# Patient Record
Sex: MALE
[De-identification: names, ages, dates, MRNs, and addresses within clinical notes are randomized per-mention and may not be internally consistent; named-entity substitution may affect disease eponyms.]

---

## 2020-09-29 ENCOUNTER — HOSPITAL ENCOUNTER (OUTPATIENT)
Dept: HOSPITAL 92 - TBSIIMAG | Age: 60
Discharge: HOME | End: 2020-09-29
Attending: UROLOGY
Payer: COMMERCIAL

## 2020-09-29 DIAGNOSIS — C61: Primary | ICD-10-CM

## 2020-09-29 LAB — ESTIMATED GFR-MDRD - POC: 76

## 2020-09-29 PROCEDURE — 81001 URINALYSIS AUTO W/SCOPE: CPT

## 2020-09-29 PROCEDURE — 72197 MRI PELVIS W/O & W/DYE: CPT

## 2020-09-29 PROCEDURE — A9579 GAD-BASE MR CONTRAST NOS,1ML: HCPCS

## 2020-09-29 PROCEDURE — 80053 COMPREHEN METABOLIC PANEL: CPT

## 2020-09-29 PROCEDURE — 87086 URINE CULTURE/COLONY COUNT: CPT

## 2020-09-29 PROCEDURE — 82565 ASSAY OF CREATININE: CPT

## 2020-09-29 NOTE — MRI
EXAM: MRI of the pelvis/prostate without and with contrast



HISTORY: Prostate cancer



COMPARISON: None



TECHNIQUE: Multiplanar multisequence MR images were obtained of the pelvis without and with IV contra
st. Evaluation of this exam was performed with a [employer] workstation.



FINDINGS:

Central gland: Moderate hypertrophy of the central gland consistent with BPH. Prostate volume is korin
mated at 39 mL. No suspicious low T2 signal lesion is seen.

Peripheral zone: There is slight limitation on the diffusion sequence and ADC map in the posterior ce
ntral peripheral zone of the prostate secondary to air in the patient's rectum producing artifact.

There is a 6 mm focus of low T2 signal in the right peripheral zone of the prostate. Evaluation for r
estricted diffusion in this region cannot be performed. This area focally enhances on the early

images in the peripheral zone.



Seminal vesicles: Intact without abnormality

Neurovascular bundles: Intact

Pelvic lymph nodes: No pelvic adenopathy

Other visualized intrapelvic structures: Scattered diverticula in the colon.

Osseous structures: No marrow signal abnormality



IMPRESSION:

PI-RADS Category 2-low likelihood that a clinically significant cancer is present. Please note that t
his exam is limited secondary to artifact from air in the patient's rectum. A follow-up of the

right peripheral zone lesion should be performed in 6-12 months. Evacuation of the rectal air with a 
fleets enema one hour prior to the examination is recommended.



Reported By: Guy North 

Electronically Signed:  9/29/2020 4:26 PM

## 2020-10-01 ENCOUNTER — HOSPITAL ENCOUNTER (OUTPATIENT)
Dept: HOSPITAL 92 - SCSCT | Age: 60
Discharge: HOME | End: 2020-10-01
Attending: UROLOGY
Payer: COMMERCIAL

## 2020-10-01 DIAGNOSIS — C61: Primary | ICD-10-CM

## 2020-10-01 DIAGNOSIS — N40.0: ICD-10-CM

## 2020-10-01 DIAGNOSIS — N20.0: ICD-10-CM

## 2020-10-01 DIAGNOSIS — N42.89: ICD-10-CM

## 2020-10-01 PROCEDURE — 74178 CT ABD&PLV WO CNTR FLWD CNTR: CPT

## 2020-10-01 NOTE — CT
CT ABDOMEN AND PELVIS WITH AND WITHOUT IV CONTRAST: 

10/1/20

 

Postcontrast images obtained in a portal venous phase and delayed venous phase.

 

INDICATIONS:

Prostate hypertrophy. 

 

Review of the noncontrast images reveals a 3 mm nonobstructing calculus in the upper collecting struc
tures of the right kidney. No other renal calculus identified. There is no hydronephrosis. Ureters ar
e normal caliber. Review of the postcontrast images shows symmetric enhancement of both kidneys. No e
vidence of renal mass or cysts. Delayed sequence shows excretion into the collecting structures. The 
collecting structure appear unremarkable. The bladder is mildly distended. 

 

Mild prostatic hypertrophy with prostatic calcification. 

 

The liver, spleen and pancreas unremarkable. Adrenal glands normal. 

 

Small bowel loops appear normal. 

 

Appendix appears normal. There is a small nodular calcification in the right lower quadrant adjacent 
to the appendix which appears benign and presumably represents a calcified mesenteric lymph node. The
re is diverticulosis of the sigmoid colon. 

 

The aorta is normal with mild atherosclerotic change. 

 

No evidence of adenopathy. 

 

 

The visualized vertebrae maintain height. Slight anterolisthesis at L5-S1 with mild degenerative martin
ges in the lumbar spine. More prominent bridging  osteophytes in the lower thoracic spine. Pelvis unr
emarkable. 

 

Images through the lung bases show calcified mediastinal and right hilar lymph nodes. 

 

IMPRESSION: 

1.      Small nonobstructing calculus in the upper collecting structures right kidney. 

2.      Mild prostatic hypertrophy with prostatic calcification. 

3.      No acute process. 

 

POS: AGW

## 2021-01-20 ENCOUNTER — HOSPITAL ENCOUNTER (OUTPATIENT)
Dept: HOSPITAL 92 - TBSIIMAG | Age: 61
Discharge: HOME | End: 2021-01-20
Attending: UROLOGY
Payer: COMMERCIAL

## 2021-01-20 DIAGNOSIS — C61: Primary | ICD-10-CM

## 2021-01-20 PROCEDURE — 72197 MRI PELVIS W/O & W/DYE: CPT

## 2021-01-20 NOTE — MRI
MR OF THE PELVIS WITH AND WITHOUT CONTRAST



INDICATION: Prostate Cancer



COMPARISON: Prior MR of the pelvis with and without contrast dated September 29, 2020



TECHNIQUE: Multiplanar, multisequence MR images were obtained of the pelvis with and without IV contr
ast. 16 cc of MultiHance was utilized for the examination. The examination was reviewed on a

separate [employer] workstation for multiplanar metric evaluation.



FINDINGS:



Prostate size:  The prostate measured 4.7 x 3.9 x 4.5cm.   37.71 cc. 



Peripheral zone: The small focal area of T2 hyperintensity measuring approximately 6-7 mm within the 
lateral right mid gland is stable. No restricted diffusion is associated with this abnormality. No

new suspicious T2 or diffusion-weighted signal abnormality is evident.



Central zone: No suspicious signal abnormality or focal lesion.



Neural vasculature: No evidence of neurovascular invasion



Regional lymphadenopathy: None



Dynamic contrast enhancement: Negative. 



Osseous structures: No suspicious osseous lesion is identified. 



Additional findings: None..



IMPRESSION:

1. PIRADS 2- Low (clinically significant cancer is unlikely to be present.)



Reported By: Alo Nixon 

Electronically Signed:  1/20/2021 3:22 PM

## 2021-03-24 ENCOUNTER — HOSPITAL ENCOUNTER (INPATIENT)
Dept: HOSPITAL 92 - SURG A | Age: 61
LOS: 8 days | Discharge: HOME | DRG: 707 | End: 2021-04-01
Attending: UROLOGY | Admitting: UROLOGY
Payer: COMMERCIAL

## 2021-03-24 ENCOUNTER — HOSPITAL ENCOUNTER (OUTPATIENT)
Dept: HOSPITAL 92 - LABBT | Age: 61
Discharge: HOME | End: 2021-03-24
Attending: UROLOGY
Payer: COMMERCIAL

## 2021-03-24 VITALS — BODY MASS INDEX: 27.8 KG/M2

## 2021-03-24 DIAGNOSIS — Z01.818: Primary | ICD-10-CM

## 2021-03-24 DIAGNOSIS — C61: ICD-10-CM

## 2021-03-24 DIAGNOSIS — N20.0: ICD-10-CM

## 2021-03-24 DIAGNOSIS — C61: Primary | ICD-10-CM

## 2021-03-24 DIAGNOSIS — Z20.822: ICD-10-CM

## 2021-03-24 DIAGNOSIS — R00.0: ICD-10-CM

## 2021-03-24 DIAGNOSIS — M25.511: ICD-10-CM

## 2021-03-24 DIAGNOSIS — I10: ICD-10-CM

## 2021-03-24 DIAGNOSIS — R18.8: ICD-10-CM

## 2021-03-24 DIAGNOSIS — R97.20: ICD-10-CM

## 2021-03-24 DIAGNOSIS — K40.90: ICD-10-CM

## 2021-03-24 DIAGNOSIS — K42.9: ICD-10-CM

## 2021-03-24 DIAGNOSIS — M25.50: ICD-10-CM

## 2021-03-24 DIAGNOSIS — N52.9: ICD-10-CM

## 2021-03-24 DIAGNOSIS — R81: ICD-10-CM

## 2021-03-24 DIAGNOSIS — M25.512: ICD-10-CM

## 2021-03-24 DIAGNOSIS — E11.8: ICD-10-CM

## 2021-03-24 LAB
ALBUMIN SERPL BCG-MCNC: 4.6 G/DL (ref 3.4–4.8)
ALP SERPL-CCNC: 90 U/L (ref 40–110)
ALT SERPL W P-5'-P-CCNC: 38 U/L (ref 8–55)
ANION GAP SERPL CALC-SCNC: 15 MMOL/L (ref 10–20)
APTT PPP: 29.9 SEC (ref 22–33)
AST SERPL-CCNC: 32 U/L (ref 5–34)
BILIRUB SERPL-MCNC: 0.6 MG/DL (ref 0.2–1.2)
BUN SERPL-MCNC: 12 MG/DL (ref 8.4–25.7)
CALCIUM SERPL-MCNC: 9.5 MG/DL (ref 7.8–10.44)
CHLORIDE SERPL-SCNC: 106 MMOL/L (ref 98–107)
CO2 SERPL-SCNC: 25 MMOL/L (ref 23–31)
CREAT CL PREDICTED SERPL C-G-VRATE: 0 ML/MIN (ref 70–130)
GLOBULIN SER CALC-MCNC: 3.1 G/DL (ref 2.4–3.5)
GLUCOSE SERPL-MCNC: 81 MG/DL (ref 80–115)
HGB BLD-MCNC: 15.3 G/DL (ref 13.5–17.5)
INR PPP: 1.1
LEUKOCYTE ESTERASE UR QL STRIP.AUTO: 500
MCH RBC QN AUTO: 29.4 PG (ref 27–33)
MCV RBC AUTO: 90.4 FL (ref 81.2–95.1)
PLATELET # BLD AUTO: 243 10X3/UL (ref 150–450)
POTASSIUM SERPL-SCNC: 4.6 MMOL/L (ref 3.5–5.1)
PROTHROMBIN TIME: 11.1 SEC (ref 9.5–12.1)
RBC # BLD AUTO: 5.21 10X6/UL (ref 4.32–5.72)
RBC UR QL AUTO: (no result) HPF (ref 0–3)
SODIUM SERPL-SCNC: 141 MMOL/L (ref 136–145)
SP GR UR STRIP: 1.01 (ref 1–1.04)
WBC # BLD AUTO: 7.7 10X3/UL (ref 3.5–10.5)
WBC UR QL AUTO: (no result) HPF (ref 0–3)

## 2021-03-24 PROCEDURE — 86850 RBC ANTIBODY SCREEN: CPT

## 2021-03-24 PROCEDURE — 82570 ASSAY OF URINE CREATININE: CPT

## 2021-03-24 PROCEDURE — 85027 COMPLETE CBC AUTOMATED: CPT

## 2021-03-24 PROCEDURE — 93010 ELECTROCARDIOGRAM REPORT: CPT

## 2021-03-24 PROCEDURE — 86901 BLOOD TYPING SEROLOGIC RH(D): CPT

## 2021-03-24 PROCEDURE — 85730 THROMBOPLASTIN TIME PARTIAL: CPT

## 2021-03-24 PROCEDURE — 88309 TISSUE EXAM BY PATHOLOGIST: CPT

## 2021-03-24 PROCEDURE — 85025 COMPLETE CBC W/AUTO DIFF WBC: CPT

## 2021-03-24 PROCEDURE — 36416 COLLJ CAPILLARY BLOOD SPEC: CPT

## 2021-03-24 PROCEDURE — 87086 URINE CULTURE/COLONY COUNT: CPT

## 2021-03-24 PROCEDURE — 71046 X-RAY EXAM CHEST 2 VIEWS: CPT

## 2021-03-24 PROCEDURE — 86900 BLOOD TYPING SEROLOGIC ABO: CPT

## 2021-03-24 PROCEDURE — 87635 SARS-COV-2 COVID-19 AMP PRB: CPT

## 2021-03-24 PROCEDURE — 93005 ELECTROCARDIOGRAM TRACING: CPT

## 2021-03-24 PROCEDURE — 84484 ASSAY OF TROPONIN QUANT: CPT

## 2021-03-24 PROCEDURE — 80048 BASIC METABOLIC PNL TOTAL CA: CPT

## 2021-03-24 PROCEDURE — U0005 INFEC AGEN DETEC AMPLI PROBE: HCPCS

## 2021-03-24 PROCEDURE — U0003 INFECTIOUS AGENT DETECTION BY NUCLEIC ACID (DNA OR RNA); SEVERE ACUTE RESPIRATORY SYNDROME CORONAVIRUS 2 (SARS-COV-2) (CORONAVIRUS DISEASE [COVID-19]), AMPLIFIED PROBE TECHNIQUE, MAKING USE OF HIGH THROUGHPUT TECHNOLOGIES AS DESCRIBED BY CMS-2020-01-R: HCPCS

## 2021-03-24 PROCEDURE — 36415 COLL VENOUS BLD VENIPUNCTURE: CPT

## 2021-03-24 PROCEDURE — 81001 URINALYSIS AUTO W/SCOPE: CPT

## 2021-03-24 PROCEDURE — 80053 COMPREHEN METABOLIC PANEL: CPT

## 2021-03-24 PROCEDURE — 85610 PROTHROMBIN TIME: CPT

## 2021-03-24 PROCEDURE — S0028 INJECTION, FAMOTIDINE, 20 MG: HCPCS

## 2021-03-29 LAB
ANION GAP SERPL CALC-SCNC: 16 MMOL/L (ref 10–20)
ANION GAP SERPL CALC-SCNC: 16 MMOL/L (ref 10–20)
BASOPHILS # BLD AUTO: 0 THOU/UL (ref 0–0.2)
BASOPHILS NFR BLD AUTO: 0.3 % (ref 0–1)
BUN SERPL-MCNC: 10 MG/DL (ref 8.4–25.7)
BUN SERPL-MCNC: 11 MG/DL (ref 8.4–25.7)
CALCIUM SERPL-MCNC: 8.1 MG/DL (ref 7.8–10.44)
CALCIUM SERPL-MCNC: 8.2 MG/DL (ref 7.8–10.44)
CHLORIDE SERPL-SCNC: 106 MMOL/L (ref 98–107)
CHLORIDE SERPL-SCNC: 107 MMOL/L (ref 98–107)
CO2 SERPL-SCNC: 19 MMOL/L (ref 23–31)
CO2 SERPL-SCNC: 19 MMOL/L (ref 23–31)
CREAT CL PREDICTED SERPL C-G-VRATE: 55 ML/MIN (ref 70–130)
CREAT CL PREDICTED SERPL C-G-VRATE: 62 ML/MIN (ref 70–130)
EOSINOPHIL # BLD AUTO: 0 THOU/UL (ref 0–0.7)
EOSINOPHIL NFR BLD AUTO: 0.3 % (ref 0–10)
GLUCOSE SERPL-MCNC: 148 MG/DL (ref 80–115)
GLUCOSE SERPL-MCNC: 149 MG/DL (ref 80–115)
HGB BLD-MCNC: 14.6 G/DL (ref 14–18)
LYMPHOCYTES # BLD: 1 THOU/UL (ref 1.2–3.4)
LYMPHOCYTES NFR BLD AUTO: 5.8 % (ref 21–51)
MCH RBC QN AUTO: 30.8 PG (ref 27–31)
MCV RBC AUTO: 91.9 FL (ref 78–98)
MONOCYTES # BLD AUTO: 1.1 THOU/UL (ref 0.11–0.59)
MONOCYTES NFR BLD AUTO: 6.8 % (ref 0–10)
NEUTROPHILS # BLD AUTO: 14.5 THOU/UL (ref 1.4–6.5)
NEUTROPHILS NFR BLD AUTO: 86.8 % (ref 42–75)
PLATELET # BLD AUTO: 222 THOU/UL (ref 130–400)
POTASSIUM SERPL-SCNC: 5.8 MMOL/L (ref 3.5–5.1)
POTASSIUM SERPL-SCNC: 7.1 MMOL/L (ref 3.5–5.1)
RBC # BLD AUTO: 4.75 MILL/UL (ref 4.7–6.1)
SODIUM SERPL-SCNC: 135 MMOL/L (ref 136–145)
SODIUM SERPL-SCNC: 135 MMOL/L (ref 136–145)
WBC # BLD AUTO: 16.7 THOU/UL (ref 4.8–10.8)

## 2021-03-29 PROCEDURE — 0VT04ZZ RESECTION OF PROSTATE, PERCUTANEOUS ENDOSCOPIC APPROACH: ICD-10-PCS | Performed by: UROLOGY

## 2021-03-29 PROCEDURE — 0VBQ4ZZ EXCISION OF BILATERAL VAS DEFERENS, PERCUTANEOUS ENDOSCOPIC APPROACH: ICD-10-PCS | Performed by: UROLOGY

## 2021-03-29 PROCEDURE — 8E0W4CZ ROBOTIC ASSISTED PROCEDURE OF TRUNK REGION, PERCUTANEOUS ENDOSCOPIC APPROACH: ICD-10-PCS | Performed by: UROLOGY

## 2021-03-29 PROCEDURE — 0VB34ZZ EXCISION OF BILATERAL SEMINAL VESICLES, PERCUTANEOUS ENDOSCOPIC APPROACH: ICD-10-PCS | Performed by: UROLOGY

## 2021-03-29 RX ADMIN — CEFTRIAXONE SCH: 1 INJECTION, POWDER, FOR SOLUTION INTRAMUSCULAR; INTRAVENOUS at 18:59

## 2021-03-29 RX ADMIN — FAMOTIDINE SCH MG: 10 INJECTION, SOLUTION INTRAVENOUS at 20:42

## 2021-03-30 LAB
ANION GAP SERPL CALC-SCNC: 12 MMOL/L (ref 10–20)
BASOPHILS # BLD AUTO: 0 THOU/UL (ref 0–0.2)
BASOPHILS NFR BLD AUTO: 0.3 % (ref 0–1)
BUN SERPL-MCNC: 10 MG/DL (ref 8.4–25.7)
CALCIUM SERPL-MCNC: 8.2 MG/DL (ref 7.8–10.44)
CHLORIDE SERPL-SCNC: 109 MMOL/L (ref 98–107)
CO2 SERPL-SCNC: 22 MMOL/L (ref 23–31)
CREAT CL PREDICTED SERPL C-G-VRATE: 74 ML/MIN (ref 70–130)
EOSINOPHIL # BLD AUTO: 0 THOU/UL (ref 0–0.7)
EOSINOPHIL NFR BLD AUTO: 0.1 % (ref 0–10)
GLUCOSE SERPL-MCNC: 117 MG/DL (ref 80–115)
HGB BLD-MCNC: 14.1 G/DL (ref 14–18)
LYMPHOCYTES # BLD: 1.4 THOU/UL (ref 1.2–3.4)
LYMPHOCYTES NFR BLD AUTO: 13.5 % (ref 21–51)
MCH RBC QN AUTO: 30.9 PG (ref 27–31)
MCV RBC AUTO: 92.5 FL (ref 78–98)
MONOCYTES # BLD AUTO: 1 THOU/UL (ref 0.11–0.59)
MONOCYTES NFR BLD AUTO: 9.3 % (ref 0–10)
NEUTROPHILS # BLD AUTO: 7.8 THOU/UL (ref 1.4–6.5)
NEUTROPHILS NFR BLD AUTO: 76.8 % (ref 42–75)
PLATELET # BLD AUTO: 205 THOU/UL (ref 130–400)
POTASSIUM SERPL-SCNC: 3.6 MMOL/L (ref 3.5–5.1)
RBC # BLD AUTO: 4.56 MILL/UL (ref 4.7–6.1)
SODIUM SERPL-SCNC: 139 MMOL/L (ref 136–145)
WBC # BLD AUTO: 10.2 THOU/UL (ref 4.8–10.8)

## 2021-03-30 RX ADMIN — CEFTRIAXONE SCH MLS: 1 INJECTION, POWDER, FOR SOLUTION INTRAMUSCULAR; INTRAVENOUS at 14:22

## 2021-03-30 RX ADMIN — FAMOTIDINE SCH MG: 10 INJECTION, SOLUTION INTRAVENOUS at 19:15

## 2021-03-30 RX ADMIN — FAMOTIDINE SCH MG: 10 INJECTION, SOLUTION INTRAVENOUS at 08:39

## 2021-03-30 RX ADMIN — HYDROCODONE BITARTRATE AND ACETAMINOPHEN PRN TAB: 10; 325 TABLET ORAL at 02:36

## 2021-03-31 LAB
ALBUMIN SERPL BCG-MCNC: 3.7 G/DL (ref 3.4–4.8)
ALP SERPL-CCNC: 67 U/L (ref 40–110)
ALT SERPL W P-5'-P-CCNC: 31 U/L (ref 8–55)
ANION GAP SERPL CALC-SCNC: 13 MMOL/L (ref 10–20)
ANION GAP SERPL CALC-SCNC: 14 MMOL/L (ref 10–20)
AST SERPL-CCNC: 34 U/L (ref 5–34)
BASOPHILS # BLD AUTO: 0 THOU/UL (ref 0–0.2)
BASOPHILS # BLD AUTO: 0 THOU/UL (ref 0–0.2)
BASOPHILS NFR BLD AUTO: 0.3 % (ref 0–1)
BASOPHILS NFR BLD AUTO: 0.4 % (ref 0–1)
BILIRUB SERPL-MCNC: 0.6 MG/DL (ref 0.2–1.2)
BUN SERPL-MCNC: 15 MG/DL (ref 8.4–25.7)
BUN SERPL-MCNC: 15 MG/DL (ref 8.4–25.7)
CALCIUM SERPL-MCNC: 8.5 MG/DL (ref 7.8–10.44)
CALCIUM SERPL-MCNC: 8.9 MG/DL (ref 7.8–10.44)
CHLORIDE SERPL-SCNC: 107 MMOL/L (ref 98–107)
CHLORIDE SERPL-SCNC: 108 MMOL/L (ref 98–107)
CO2 SERPL-SCNC: 19 MMOL/L (ref 23–31)
CO2 SERPL-SCNC: 20 MMOL/L (ref 23–31)
CREAT CL PREDICTED SERPL C-G-VRATE: 77 ML/MIN (ref 70–130)
CREAT CL PREDICTED SERPL C-G-VRATE: 84 ML/MIN (ref 70–130)
EOSINOPHIL # BLD AUTO: 0.1 THOU/UL (ref 0–0.7)
EOSINOPHIL # BLD AUTO: 0.1 THOU/UL (ref 0–0.7)
EOSINOPHIL NFR BLD AUTO: 1.4 % (ref 0–10)
EOSINOPHIL NFR BLD AUTO: 1.5 % (ref 0–10)
GLOBULIN SER CALC-MCNC: 3.3 G/DL (ref 2.4–3.5)
GLUCOSE SERPL-MCNC: 106 MG/DL (ref 80–115)
GLUCOSE SERPL-MCNC: 129 MG/DL (ref 80–115)
HGB BLD-MCNC: 13.4 G/DL (ref 14–18)
HGB BLD-MCNC: 14.1 G/DL (ref 14–18)
LYMPHOCYTES # BLD: 1.1 THOU/UL (ref 1.2–3.4)
LYMPHOCYTES # BLD: 1.1 THOU/UL (ref 1.2–3.4)
LYMPHOCYTES NFR BLD AUTO: 10.3 % (ref 21–51)
LYMPHOCYTES NFR BLD AUTO: 11.9 % (ref 21–51)
MCH RBC QN AUTO: 30.1 PG (ref 27–31)
MCH RBC QN AUTO: 30.7 PG (ref 27–31)
MCV RBC AUTO: 92.7 FL (ref 78–98)
MCV RBC AUTO: 93.3 FL (ref 78–98)
MONOCYTES # BLD AUTO: 0.8 THOU/UL (ref 0.11–0.59)
MONOCYTES # BLD AUTO: 0.8 THOU/UL (ref 0.11–0.59)
MONOCYTES NFR BLD AUTO: 7.8 % (ref 0–10)
MONOCYTES NFR BLD AUTO: 8.1 % (ref 0–10)
NEUTROPHILS # BLD AUTO: 7.4 THOU/UL (ref 1.4–6.5)
NEUTROPHILS # BLD AUTO: 8.4 THOU/UL (ref 1.4–6.5)
NEUTROPHILS NFR BLD AUTO: 78.2 % (ref 42–75)
NEUTROPHILS NFR BLD AUTO: 80.2 % (ref 42–75)
PLATELET # BLD AUTO: 192 THOU/UL (ref 130–400)
PLATELET # BLD AUTO: 210 THOU/UL (ref 130–400)
POTASSIUM SERPL-SCNC: 3.7 MMOL/L (ref 3.5–5.1)
POTASSIUM SERPL-SCNC: 4.2 MMOL/L (ref 3.5–5.1)
RBC # BLD AUTO: 4.35 MILL/UL (ref 4.7–6.1)
RBC # BLD AUTO: 4.69 MILL/UL (ref 4.7–6.1)
SODIUM SERPL-SCNC: 136 MMOL/L (ref 136–145)
SODIUM SERPL-SCNC: 137 MMOL/L (ref 136–145)
WBC # BLD AUTO: 10.4 THOU/UL (ref 4.8–10.8)
WBC # BLD AUTO: 9.5 THOU/UL (ref 4.8–10.8)

## 2021-03-31 RX ADMIN — FAMOTIDINE SCH MG: 10 INJECTION, SOLUTION INTRAVENOUS at 20:55

## 2021-03-31 RX ADMIN — FAMOTIDINE SCH MG: 10 INJECTION, SOLUTION INTRAVENOUS at 08:18

## 2021-03-31 RX ADMIN — HYDROCODONE BITARTRATE AND ACETAMINOPHEN PRN TAB: 10; 325 TABLET ORAL at 20:56

## 2021-03-31 RX ADMIN — ONDANSETRON PRN MG: 2 INJECTION INTRAMUSCULAR; INTRAVENOUS at 14:09

## 2021-04-01 VITALS — TEMPERATURE: 98 F | SYSTOLIC BLOOD PRESSURE: 159 MMHG | DIASTOLIC BLOOD PRESSURE: 89 MMHG

## 2021-04-01 LAB
ANION GAP SERPL CALC-SCNC: 11 MMOL/L (ref 10–20)
BASOPHILS # BLD AUTO: 0 THOU/UL (ref 0–0.2)
BASOPHILS NFR BLD AUTO: 0.3 % (ref 0–1)
BUN SERPL-MCNC: 18 MG/DL (ref 8.4–25.7)
CALCIUM SERPL-MCNC: 8.8 MG/DL (ref 7.8–10.44)
CHLORIDE SERPL-SCNC: 106 MMOL/L (ref 98–107)
CO2 SERPL-SCNC: 22 MMOL/L (ref 23–31)
CREAT CL PREDICTED SERPL C-G-VRATE: 80 ML/MIN (ref 70–130)
EOSINOPHIL # BLD AUTO: 0.3 THOU/UL (ref 0–0.7)
EOSINOPHIL NFR BLD AUTO: 3 % (ref 0–10)
GLUCOSE SERPL-MCNC: 73 MG/DL (ref 80–115)
HGB BLD-MCNC: 13.2 G/DL (ref 14–18)
LYMPHOCYTES # BLD: 1.7 THOU/UL (ref 1.2–3.4)
LYMPHOCYTES NFR BLD AUTO: 17 % (ref 21–51)
MCH RBC QN AUTO: 29.7 PG (ref 27–31)
MCV RBC AUTO: 92.9 FL (ref 78–98)
MONOCYTES # BLD AUTO: 0.8 THOU/UL (ref 0.11–0.59)
MONOCYTES NFR BLD AUTO: 8.3 % (ref 0–10)
NEUTROPHILS # BLD AUTO: 7 THOU/UL (ref 1.4–6.5)
NEUTROPHILS NFR BLD AUTO: 71.4 % (ref 42–75)
PLATELET # BLD AUTO: 209 THOU/UL (ref 130–400)
POTASSIUM SERPL-SCNC: 3.8 MMOL/L (ref 3.5–5.1)
RBC # BLD AUTO: 4.45 MILL/UL (ref 4.7–6.1)
SODIUM SERPL-SCNC: 135 MMOL/L (ref 136–145)
WBC # BLD AUTO: 9.8 THOU/UL (ref 4.8–10.8)

## 2021-04-01 RX ADMIN — ONDANSETRON PRN MG: 2 INJECTION INTRAMUSCULAR; INTRAVENOUS at 08:55

## 2021-04-01 RX ADMIN — FAMOTIDINE SCH MG: 10 INJECTION, SOLUTION INTRAVENOUS at 08:55

## 2021-04-01 RX ADMIN — FAMOTIDINE SCH: 10 INJECTION, SOLUTION INTRAVENOUS at 09:27

## 2021-04-13 ENCOUNTER — HOSPITAL ENCOUNTER (OUTPATIENT)
Dept: HOSPITAL 92 - RAD | Age: 61
Discharge: HOME | End: 2021-04-13
Attending: UROLOGY
Payer: COMMERCIAL

## 2021-04-13 DIAGNOSIS — C61: Primary | ICD-10-CM

## 2021-04-13 PROCEDURE — 74430 CONTRAST X-RAY BLADDER: CPT

## 2021-04-13 PROCEDURE — 51600 INJECTION FOR BLADDER X-RAY: CPT

## 2022-05-02 ENCOUNTER — HOSPITAL ENCOUNTER (OUTPATIENT)
Dept: HOSPITAL 92 - RAD | Age: 62
Discharge: HOME | End: 2022-05-02
Attending: UROLOGY
Payer: COMMERCIAL

## 2022-05-02 DIAGNOSIS — N20.0: Primary | ICD-10-CM

## 2022-05-02 PROCEDURE — 74018 RADEX ABDOMEN 1 VIEW: CPT

## 2023-02-28 ENCOUNTER — HOSPITAL ENCOUNTER (OUTPATIENT)
Dept: HOSPITAL 92 - CSHULT | Age: 63
Discharge: HOME | End: 2023-02-28
Attending: UROLOGY
Payer: COMMERCIAL

## 2023-02-28 DIAGNOSIS — N20.0: Primary | ICD-10-CM

## 2023-02-28 PROCEDURE — 76770 US EXAM ABDO BACK WALL COMP: CPT

## 2023-02-28 PROCEDURE — 74018 RADEX ABDOMEN 1 VIEW: CPT

## 2024-07-17 ENCOUNTER — HOSPITAL ENCOUNTER (OUTPATIENT)
Dept: HOSPITAL 92 - SCSRAD | Age: 64
Discharge: HOME | End: 2024-07-17
Attending: UROLOGY
Payer: MEDICARE

## 2024-07-17 DIAGNOSIS — N20.0: Primary | ICD-10-CM

## 2024-07-17 PROCEDURE — 84153 ASSAY OF PSA TOTAL: CPT

## 2024-07-17 PROCEDURE — 74018 RADEX ABDOMEN 1 VIEW: CPT

## 2024-07-17 PROCEDURE — 80048 BASIC METABOLIC PNL TOTAL CA: CPT

## 2024-07-17 PROCEDURE — 36415 COLL VENOUS BLD VENIPUNCTURE: CPT

## 2024-07-17 PROCEDURE — 81001 URINALYSIS AUTO W/SCOPE: CPT

## 2024-07-17 PROCEDURE — 87086 URINE CULTURE/COLONY COUNT: CPT

## 2024-07-17 PROCEDURE — 83036 HEMOGLOBIN GLYCOSYLATED A1C: CPT

## 2024-07-18 ENCOUNTER — HOSPITAL ENCOUNTER (OUTPATIENT)
Dept: HOSPITAL 92 - CSHULT | Age: 64
Discharge: HOME | End: 2024-07-18
Attending: UROLOGY
Payer: COMMERCIAL

## 2024-07-18 DIAGNOSIS — N20.0: Primary | ICD-10-CM

## 2024-07-18 PROCEDURE — 76770 US EXAM ABDO BACK WALL COMP: CPT

## 2025-01-30 ENCOUNTER — HOSPITAL ENCOUNTER (OUTPATIENT)
Dept: HOSPITAL 92 - BICMAMMO | Age: 65
Discharge: HOME | End: 2025-01-30
Payer: COMMERCIAL

## 2025-01-30 DIAGNOSIS — M85.80: Primary | ICD-10-CM

## 2025-01-30 PROCEDURE — 77080 DXA BONE DENSITY AXIAL: CPT

## 2025-07-25 ENCOUNTER — HOSPITAL ENCOUNTER (OUTPATIENT)
Dept: HOSPITAL 92 - CSHRAD | Age: 65
Discharge: HOME | End: 2025-07-25
Payer: COMMERCIAL

## 2025-07-25 DIAGNOSIS — M54.50: Primary | ICD-10-CM

## 2025-07-25 DIAGNOSIS — M47.816: ICD-10-CM

## 2025-07-25 PROCEDURE — 72100 X-RAY EXAM L-S SPINE 2/3 VWS: CPT
